# Patient Record
(demographics unavailable — no encounter records)

---

## 2025-06-04 NOTE — HISTORY OF PRESENT ILLNESS
[FreeTextEntry1] : Joaquin Yin IS A 26- YEAR- OLD MALE WITH adhd AND ABNORMAL elg COMES FOR CARDIAC EVALUATION. dENIES ANY CHEST PAIN OR PALPITATIONS. fATIGUE AND MILD SHORTNESS OF BREATH ON EXERTION.

## 2025-06-04 NOTE — REVIEW OF SYSTEMS
[Fever] : no fever [Headache] : no headache [Chills] : no chills [Feeling Fatigued] : feeling fatigued [Dyspnea on exertion] : dyspnea during exertion [Chest Discomfort] : no chest discomfort [Lower Ext Edema] : no extremity edema [Palpitations] : no palpitations [Orthopnea] : no orthopnea [PND] : no PND [Abdominal Pain] : no abdominal pain [Nausea] : no nausea [Vomiting] : no vomiting [Heartburn] : no heartburn [Joint Pain] : no joint pain [Rash] : no rash [Itching] : no itching [Dizziness] : no dizziness [Tremor] : no tremor was seen [Confusion] : no confusion was observed [Anxiety] : no anxiety [Under Stress] : not under stress [Easy Bleeding] : no tendency for easy bleeding [Easy Bruising] : no tendency for easy bruising [Negative] : Respiratory

## 2025-06-04 NOTE — DISCUSSION/SUMMARY
[FreeTextEntry1] : In a summary Mr. Rothman, Jose is a young- male with abnormal EKG and shortness of breath, will get Echo to assess LV systolic function and wall motion. Further plan based on Echo results.